# Patient Record
Sex: FEMALE | Race: WHITE | NOT HISPANIC OR LATINO | Employment: FULL TIME | URBAN - METROPOLITAN AREA
[De-identification: names, ages, dates, MRNs, and addresses within clinical notes are randomized per-mention and may not be internally consistent; named-entity substitution may affect disease eponyms.]

---

## 2023-01-07 ENCOUNTER — APPOINTMENT (OUTPATIENT)
Dept: CT IMAGING | Facility: CLINIC | Age: 38
End: 2023-01-07
Attending: EMERGENCY MEDICINE
Payer: COMMERCIAL

## 2023-01-07 ENCOUNTER — HOSPITAL ENCOUNTER (EMERGENCY)
Facility: CLINIC | Age: 38
Discharge: HOME | End: 2023-01-08
Attending: EMERGENCY MEDICINE | Admitting: EMERGENCY MEDICINE
Payer: COMMERCIAL

## 2023-01-07 ENCOUNTER — APPOINTMENT (OUTPATIENT)
Dept: ULTRASOUND IMAGING | Facility: CLINIC | Age: 38
End: 2023-01-07
Attending: EMERGENCY MEDICINE
Payer: COMMERCIAL

## 2023-01-07 VITALS
TEMPERATURE: 97.4 F | WEIGHT: 217 LBS | OXYGEN SATURATION: 99 % | BODY MASS INDEX: 34.06 KG/M2 | HEIGHT: 67 IN | DIASTOLIC BLOOD PRESSURE: 76 MMHG | RESPIRATION RATE: 16 BRPM | SYSTOLIC BLOOD PRESSURE: 116 MMHG | HEART RATE: 78 BPM

## 2023-01-07 DIAGNOSIS — N83.201 CYST OF RIGHT OVARY: ICD-10-CM

## 2023-01-07 DIAGNOSIS — K52.9 INFLAMMATION OF SMALL INTESTINE: ICD-10-CM

## 2023-01-07 LAB
ALBUMIN SERPL-MCNC: 3.9 G/DL (ref 3.5–5)
ALBUMIN UR-MCNC: NEGATIVE MG/DL
ALP SERPL-CCNC: 58 U/L (ref 45–120)
ALT SERPL W P-5'-P-CCNC: 14 U/L (ref 0–45)
ANION GAP SERPL CALCULATED.3IONS-SCNC: 8 MMOL/L (ref 5–18)
APPEARANCE UR: CLEAR
AST SERPL W P-5'-P-CCNC: 11 U/L (ref 0–40)
BACTERIA #/AREA URNS HPF: ABNORMAL /HPF
BASOPHILS # BLD AUTO: 0 10E3/UL (ref 0–0.2)
BASOPHILS NFR BLD AUTO: 1 %
BILIRUB SERPL-MCNC: 0.9 MG/DL (ref 0–1)
BILIRUB UR QL STRIP: NEGATIVE
BUN SERPL-MCNC: 10 MG/DL (ref 8–22)
CALCIUM SERPL-MCNC: 8.9 MG/DL (ref 8.5–10.5)
CHLORIDE BLD-SCNC: 104 MMOL/L (ref 98–107)
CO2 SERPL-SCNC: 26 MMOL/L (ref 22–31)
COLOR UR AUTO: ABNORMAL
CREAT SERPL-MCNC: 0.69 MG/DL (ref 0.6–1.1)
EOSINOPHIL # BLD AUTO: 0.1 10E3/UL (ref 0–0.7)
EOSINOPHIL NFR BLD AUTO: 1 %
ERYTHROCYTE [DISTWIDTH] IN BLOOD BY AUTOMATED COUNT: 12.3 % (ref 10–15)
GFR SERPL CREATININE-BSD FRML MDRD: >90 ML/MIN/1.73M2
GLUCOSE BLD-MCNC: 79 MG/DL (ref 70–125)
GLUCOSE UR STRIP-MCNC: NEGATIVE MG/DL
HCG SERPL QL: NEGATIVE
HCT VFR BLD AUTO: 43.1 % (ref 35–47)
HGB BLD-MCNC: 14.7 G/DL (ref 11.7–15.7)
HGB UR QL STRIP: NEGATIVE
IMM GRANULOCYTES # BLD: 0 10E3/UL
IMM GRANULOCYTES NFR BLD: 0 %
KETONES UR STRIP-MCNC: ABNORMAL MG/DL
LEUKOCYTE ESTERASE UR QL STRIP: ABNORMAL
LIPASE SERPL-CCNC: 21 U/L (ref 0–52)
LYMPHOCYTES # BLD AUTO: 2.2 10E3/UL (ref 0.8–5.3)
LYMPHOCYTES NFR BLD AUTO: 27 %
MCH RBC QN AUTO: 30.1 PG (ref 26.5–33)
MCHC RBC AUTO-ENTMCNC: 34.1 G/DL (ref 31.5–36.5)
MCV RBC AUTO: 88 FL (ref 78–100)
MONOCYTES # BLD AUTO: 0.6 10E3/UL (ref 0–1.3)
MONOCYTES NFR BLD AUTO: 8 %
MUCOUS THREADS #/AREA URNS LPF: PRESENT /LPF
NEUTROPHILS # BLD AUTO: 5.3 10E3/UL (ref 1.6–8.3)
NEUTROPHILS NFR BLD AUTO: 63 %
NITRATE UR QL: NEGATIVE
NRBC # BLD AUTO: 0 10E3/UL
NRBC BLD AUTO-RTO: 0 /100
PH UR STRIP: 5.5 [PH] (ref 5–7)
PLATELET # BLD AUTO: 283 10E3/UL (ref 150–450)
POTASSIUM BLD-SCNC: 4 MMOL/L (ref 3.5–5)
PROT SERPL-MCNC: 7.2 G/DL (ref 6–8)
RBC # BLD AUTO: 4.88 10E6/UL (ref 3.8–5.2)
RBC URINE: 2 /HPF
SODIUM SERPL-SCNC: 138 MMOL/L (ref 136–145)
SP GR UR STRIP: 1.02 (ref 1–1.03)
SQUAMOUS EPITHELIAL: 1 /HPF
UROBILINOGEN UR STRIP-MCNC: <2 MG/DL
WBC # BLD AUTO: 8.4 10E3/UL (ref 4–11)
WBC URINE: 5 /HPF

## 2023-01-07 PROCEDURE — 85025 COMPLETE CBC W/AUTO DIFF WBC: CPT | Performed by: EMERGENCY MEDICINE

## 2023-01-07 PROCEDURE — 83690 ASSAY OF LIPASE: CPT | Performed by: EMERGENCY MEDICINE

## 2023-01-07 PROCEDURE — 76856 US EXAM PELVIC COMPLETE: CPT

## 2023-01-07 PROCEDURE — 87086 URINE CULTURE/COLONY COUNT: CPT | Performed by: EMERGENCY MEDICINE

## 2023-01-07 PROCEDURE — 74177 CT ABD & PELVIS W/CONTRAST: CPT

## 2023-01-07 PROCEDURE — 258N000003 HC RX IP 258 OP 636: Performed by: EMERGENCY MEDICINE

## 2023-01-07 PROCEDURE — 84703 CHORIONIC GONADOTROPIN ASSAY: CPT | Performed by: EMERGENCY MEDICINE

## 2023-01-07 PROCEDURE — 81001 URINALYSIS AUTO W/SCOPE: CPT | Performed by: EMERGENCY MEDICINE

## 2023-01-07 PROCEDURE — 96361 HYDRATE IV INFUSION ADD-ON: CPT

## 2023-01-07 PROCEDURE — 96374 THER/PROPH/DIAG INJ IV PUSH: CPT | Mod: 59

## 2023-01-07 PROCEDURE — 80053 COMPREHEN METABOLIC PANEL: CPT | Performed by: EMERGENCY MEDICINE

## 2023-01-07 PROCEDURE — 250N000011 HC RX IP 250 OP 636: Performed by: EMERGENCY MEDICINE

## 2023-01-07 PROCEDURE — 99285 EMERGENCY DEPT VISIT HI MDM: CPT | Mod: 25

## 2023-01-07 PROCEDURE — 36415 COLL VENOUS BLD VENIPUNCTURE: CPT | Performed by: EMERGENCY MEDICINE

## 2023-01-07 RX ORDER — KETOROLAC TROMETHAMINE 15 MG/ML
15 INJECTION, SOLUTION INTRAMUSCULAR; INTRAVENOUS ONCE
Status: COMPLETED | OUTPATIENT
Start: 2023-01-07 | End: 2023-01-07

## 2023-01-07 RX ORDER — OXYCODONE AND ACETAMINOPHEN 5; 325 MG/1; MG/1
1 TABLET ORAL EVERY 6 HOURS PRN
Qty: 12 TABLET | Refills: 0 | Status: SHIPPED | OUTPATIENT
Start: 2023-01-07 | End: 2023-01-10

## 2023-01-07 RX ORDER — IOPAMIDOL 755 MG/ML
90 INJECTION, SOLUTION INTRAVASCULAR ONCE
Status: COMPLETED | OUTPATIENT
Start: 2023-01-07 | End: 2023-01-07

## 2023-01-07 RX ORDER — SODIUM CHLORIDE 9 MG/ML
INJECTION, SOLUTION INTRAVENOUS CONTINUOUS
Status: DISCONTINUED | OUTPATIENT
Start: 2023-01-07 | End: 2023-01-07

## 2023-01-07 RX ADMIN — KETOROLAC TROMETHAMINE 15 MG: 15 INJECTION, SOLUTION INTRAMUSCULAR; INTRAVENOUS at 19:40

## 2023-01-07 RX ADMIN — SODIUM CHLORIDE 1000 ML: 9 INJECTION, SOLUTION INTRAVENOUS at 19:40

## 2023-01-07 RX ADMIN — IOPAMIDOL 90 ML: 755 INJECTION, SOLUTION INTRAVENOUS at 23:22

## 2023-01-07 ASSESSMENT — ENCOUNTER SYMPTOMS
ABDOMINAL PAIN: 1
DIAPHORESIS: 0
FEVER: 0
BLOOD IN STOOL: 0
BACK PAIN: 0
DIARRHEA: 0
FLANK PAIN: 0
CHILLS: 0

## 2023-01-07 ASSESSMENT — ACTIVITIES OF DAILY LIVING (ADL)
ADLS_ACUITY_SCORE: 33
ADLS_ACUITY_SCORE: 35
ADLS_ACUITY_SCORE: 35

## 2023-01-08 NOTE — ED PROVIDER NOTES
EMERGENCY DEPARTMENT ENCOUNTER      NAME: Lor Davila  AGE: 37 year old female  YOB: 1985  MRN: [id number]  EVALUATION DATE & TIME: 1/7/2023  6:59 PM    PCP: Chel Chanel    ED PROVIDER: JOE Graves    Chief Complaint   Patient presents with     Abdominal Pain     FINAL IMPRESSION:  1. Inflammation of small intestine    2. Cyst of right ovary        ED COURSE & MEDICAL DECISION MAKING:    Pertinent Labs & Imaging studies reviewed. (See chart for details)    37 year old female presents to the Emergency Department for evaluation of abdominal discomfort.  Patient was in her usual state of health when she had sudden onset of lower abdominal pain.  Describes a midline lower abdominal pain from the umbilicus down. At times its intense and seems to wax and wane in intensity.  Patient has never had a pain like this before.  Remote history of appendectomy when she was age 14.  Otherwise healthy.  Usual state of health earlier today with no symptomatology.  Has not urinated since the pain started no history of recurrent UTI.  No vaginal symptoms.  Has an IUD.  No history of ovarian issues.  No history of kidney stones.  On examination patient had left lower quadrant and suprapubic pain to palpation.  No guarding or peritoneal signs.  Vital signs were unremarkable and clinical examination was otherwise unremarkable.  I felt ovarian pathology was initially the most likely diagnosis.  I recommended the patient that we initiate screen laboratory testing urinalysis and a pelvic ultrasound for additional assessment.  If ultrasound is nondiagnostic urinalysis does not demonstrate infection would anticipate need for CT scan imaging.  Will reassess after return of initial testing.    12:07 AM  Ultrasound demonstrated a right-sided hemorrhagic ovarian cyst.  This could be the source of patient's pain although her pain seemed more focused on the left side to a somewhat unusual location I did recommend CT  scan imaging for further assessment.  CT scan imaging demonstrating inflammation of the terminal ileum.  Could be associated with inflammatory bowel disease.  On further discussion with the patient she does have some chronic issues with her stomach and intestines is not relating any diarrhea or bloody stool currently so unclear if this is an incidental finding or could be related to her symptoms I would favor given the sudden onset of her symptoms tonight that is more consistent with a hemorrhagic ovarian cyst.  Ultimately given her clinical improvement and otherwise negative work-up I think discharge home is appropriate.  Her significant other who is here in the emergency department actually suffers from Crohn's disease so he is well knowledgeable on the subject.  Overall plan of care for discharge home on anti-inflammatory medication for management of her symptoms follow-up with [facility] recommended in her primary care doctor.  Serial ultrasounds to ensure resolution of her cyst.  I also did prescribe prescription pain medication for breakthrough pain should her symptoms not be controlled with ibuprofen or Tylenol.  Patient comfortable this plan of care.    7:02 PM  I met the patient and performed my initial interview and exam.     11:55 PM   Rechecked and updated patient. I discussed plans for discharge with the patient, which they were agreeable to. We discussed supportive cares at home and reasons for return to the ER including new or worsening symptoms. All questions and concerns were addressed. Patient to be discharged by RN.       Medical Decision Making    History:    Supplemental history from: Documented in HPI, if applicable    External Record(s) reviewed: Documented in HPI, if applicable.    Work Up:    Chart documentation includes differential considered and any EKGs or imaging independently interpreted by provider.    In additional to work up documented, I considered the following  work up: See chart documentation, if applicable.    External consultation:    Discussion of management with another provider: See chart documentation, if applicable    Complicating factors:    Care impacted by chronic illness: N/A    Care affected by social determinants of health: N/A    Disposition considerations: Discharge. I prescribed additional prescription strength medication(s) as charted. I considered admission, but discharged patient after significant clinical improvement.        MEDICATIONS GIVEN IN THE EMERGENCY:  New Prescriptions    No medications on file       NEW PRESCRIPTIONS STARTED AT TODAY'S ER VISIT  Patient's Medications   New Prescriptions    No medications on file   Previous Medications    FAMOTIDINE (PEPCID) 20 MG TABLET    [FAMOTIDINE (PEPCID) 20 MG TABLET] Take 1 tablet (20 mg total) by mouth 2 (two) times a day.    ONDANSETRON (ZOFRAN ODT) 8 MG DISINTEGRATING TABLET    [ONDANSETRON (ZOFRAN ODT) 8 MG DISINTEGRATING TABLET] Take 1 tablet (8 mg total) by mouth every 6 (six) hours as needed for nausea.    PANTOPRAZOLE (PROTONIX) 40 MG TABLET    [PANTOPRAZOLE (PROTONIX) 40 MG TABLET] Take 1 tablet (40 mg total) by mouth daily.   Modified Medications    No medications on file   Discontinued Medications    No medications on file          =================================================================    HPI    Patient information was obtained from: Patient    Use of Intrepreter: N/A       Lor Davila is a 37 year old female with a pertinent medical history of GERD and thyroid disorder who presents to the ED via walk-in for evaluation of abdominal pain.    Patient reports that approximately 2 hours ago around 5:00 - 5:30 PM she had a sudden onset of lower-middle abdominal pain. She states that the pain is intermittent, but notes that when the pain onsets it is significantly sharp for approximately 5 - 10 minutes. She mentions that this pain is localized. She denies having any of her  "current symptoms in the past. She endorses having a normal bowel movement since onset of the pain, and she denies any recent blood in her stool or diarrhea. She denies urinating since onset of the pain. She denies any pregnancy concerns. She denies any known history of kidney stones or ovarian cysts. She denies any history of urinary issues except for when she was pregnant. She endorses a PMH of an appendectomy in 1999. She states she has an IUD in place and so her menstrual periods are irregular, but she denies any recent vaginal bleeding. She also denies any recent fever, chills, diaphoresis, back pain, flank pain, or any other complications at this time.      REVIEW OF SYSTEMS   Review of Systems   Constitutional: Negative for chills, diaphoresis and fever.   Gastrointestinal: Positive for abdominal pain (lower-middle). Negative for blood in stool and diarrhea.   Genitourinary: Negative for flank pain and vaginal bleeding.   Musculoskeletal: Negative for back pain.   All other systems reviewed and are negative.       PAST MEDICAL HISTORY:  History reviewed. No pertinent past medical history.    PAST SURGICAL HISTORY:  No past surgical history on file.    ALLERGIES:  No Known Allergies    FAMILY HISTORY:  No family history on file.    SOCIAL HISTORY:   Social History     Socioeconomic History     Marital status:    Tobacco Use     Smoking status: Never   Substance and Sexual Activity     Alcohol use: No     Drug use: No       VITALS:  Patient Vitals for the past 24 hrs:   BP Temp Temp src Pulse Resp SpO2 Height Weight   01/07/23 1804 116/76 97.4  F (36.3  C) Temporal 78 16 99 % 1.702 m (5' 7\") 98.4 kg (217 lb)       PHYSICAL EXAM    Constitutional: Well developed, Well nourished, NAD  HENT: Normocephalic, Atraumatic, Bilateral external ears normal, Oropharynx normal, mucous membranes moist, Nose normal. Neck-  Normal range of motion, No tenderness, Supple, No stridor.   Eyes: PERRL, EOMI, Conjunctiva " normal, No discharge.   Respiratory: Normal breath sounds, No respiratory distress, No wheezing, Speaks full sentences easily. No cough.   Cardiovascular: Normal heart rate, Regular rhythm, No murmurs Chest wall nontender.    GI: Lower abdominal pain, suprapubic and left lower quadrant no guarding or peritoneal signs  : No cva tenderness    Musculoskeletal: 2+ DP pulses. No edema. No cyanosis. Good range of motion in all major joints. No tenderness to palpation. No tenderness of the CTLS spine.   Integument: Warm, Dry, No erythema, No rash. No petechiae.   Neurologic: Alert & oriented x 3, Normal motor function, Normal sensory function, No focal deficits noted.   Psychiatric: Affect normal, Judgment normal, Mood normal. Cooperative.       LAB:  All pertinent labs reviewed and interpreted.  Results for orders placed or performed during the hospital encounter of 01/07/23   US Pelvis Cmplt w Transvag & Doppler LmtPel Duplex Limited    Impression    IMPRESSION:    1.  Normal Doppler flow demonstrated to both ovaries.    2.  At the right adnexa there is a 2.5 cm presumed hemorrhagic cyst or follicle. Abutting this is a 2.4 cm echogenic focus which appears avascular on Doppler interrogation. This is within a moderate amount of complex fluid in the right adnexal region, of   similar echogenicity to the fluid in the posterior cul-de-sac. It is possible that this represents the sequela of a recent follicle rupture/hemorrhage. If there is concern for other etiologies such as infection, a contrast CT may be of benefit.         CT Abdomen Pelvis w Contrast    Impression    IMPRESSION:   1.  Mildly inflamed terminal ileum suggesting inflammatory bowel disease (Crohn's disease). Follow-up GI consultation recommended.   Comprehensive metabolic panel   Result Value Ref Range    Sodium 138 136 - 145 mmol/L    Potassium 4.0 3.5 - 5.0 mmol/L    Chloride 104 98 - 107 mmol/L    Carbon Dioxide (CO2) 26 22 - 31 mmol/L    Anion Gap 8 5 -  18 mmol/L    Urea Nitrogen 10 8 - 22 mg/dL    Creatinine 0.69 0.60 - 1.10 mg/dL    Calcium 8.9 8.5 - 10.5 mg/dL    Glucose 79 70 - 125 mg/dL    Alkaline Phosphatase 58 45 - 120 U/L    AST 11 0 - 40 U/L    ALT 14 0 - 45 U/L    Protein Total 7.2 6.0 - 8.0 g/dL    Albumin 3.9 3.5 - 5.0 g/dL    Bilirubin Total 0.9 0.0 - 1.0 mg/dL    GFR Estimate >90 >60 mL/min/1.73m2   Result Value Ref Range    Lipase 21 0 - 52 U/L   UA with Microscopic reflex to Culture    Specimen: Urine, Clean Catch   Result Value Ref Range    Color Urine Light Yellow Colorless, Straw, Light Yellow, Yellow    Appearance Urine Clear Clear    Glucose Urine Negative Negative mg/dL    Bilirubin Urine Negative Negative    Ketones Urine Trace (A) Negative mg/dL    Specific Gravity Urine 1.021 1.001 - 1.030    Blood Urine Negative Negative    pH Urine 5.5 5.0 - 7.0    Protein Albumin Urine Negative Negative mg/dL    Urobilinogen Urine <2.0 <2.0 mg/dL    Nitrite Urine Negative Negative    Leukocyte Esterase Urine 250 Zeb/uL (A) Negative    Bacteria Urine Few (A) None Seen /HPF    Mucus Urine Present (A) None Seen /LPF    RBC Urine 2 <=2 /HPF    WBC Urine 5 <=5 /HPF    Squamous Epithelials Urine 1 <=1 /HPF   HCG QUALitative pregnancy (blood)   Result Value Ref Range    hCG Serum Qualitative Negative Negative   CBC with platelets and differential   Result Value Ref Range    WBC Count 8.4 4.0 - 11.0 10e3/uL    RBC Count 4.88 3.80 - 5.20 10e6/uL    Hemoglobin 14.7 11.7 - 15.7 g/dL    Hematocrit 43.1 35.0 - 47.0 %    MCV 88 78 - 100 fL    MCH 30.1 26.5 - 33.0 pg    MCHC 34.1 31.5 - 36.5 g/dL    RDW 12.3 10.0 - 15.0 %    Platelet Count 283 150 - 450 10e3/uL    % Neutrophils 63 %    % Lymphocytes 27 %    % Monocytes 8 %    % Eosinophils 1 %    % Basophils 1 %    % Immature Granulocytes 0 %    NRBCs per 100 WBC 0 <1 /100    Absolute Neutrophils 5.3 1.6 - 8.3 10e3/uL    Absolute Lymphocytes 2.2 0.8 - 5.3 10e3/uL    Absolute Monocytes 0.6 0.0 - 1.3 10e3/uL    Absolute  Eosinophils 0.1 0.0 - 0.7 10e3/uL    Absolute Basophils 0.0 0.0 - 0.2 10e3/uL    Absolute Immature Granulocytes 0.0 <=0.4 10e3/uL    Absolute NRBCs 0.0 10e3/uL       RADIOLOGY:  Reviewed all pertinent imaging. Please see official radiology report.  CT Abdomen Pelvis w Contrast   Final Result   IMPRESSION:    1.  Mildly inflamed terminal ileum suggesting inflammatory bowel disease (Crohn's disease). Follow-up GI consultation recommended.      US Pelvis Cmplt w Transvag & Doppler LmtPel Duplex Limited   Final Result   IMPRESSION:     1.  Normal Doppler flow demonstrated to both ovaries.      2.  At the right adnexa there is a 2.5 cm presumed hemorrhagic cyst or follicle. Abutting this is a 2.4 cm echogenic focus which appears avascular on Doppler interrogation. This is within a moderate amount of complex fluid in the right adnexal region, of    similar echogenicity to the fluid in the posterior cul-de-sac. It is possible that this represents the sequela of a recent follicle rupture/hemorrhage. If there is concern for other etiologies such as infection, a contrast CT may be of benefit.                   EKG:    None.    PROCEDURES:   None.       I, Bentley Meza, am serving as a scribe to document services personally performed by Dr. Graves based on my observation and the provider's statements to me. I, Tanner Graves MD attest that Bentley Meza is acting in a scribe capacity, has observed my performance of the services and has documented them in accordance with my direction.    Tanner Graves M.D.  Emergency Medicine  [facility] EMERGENCY ROOM  [address] [place] [id number] [phone]  Dept: [phone]         Tanner Graves MD  01/08/23 0009

## 2023-01-08 NOTE — DISCHARGE INSTRUCTIONS
Your laboratory testing overall was reassuring.  The ultrasound demonstrated a hemorrhagic ovarian cyst on the right side that was small this should be self resolving.  Recommend follow-up on outpatient basis and the next 2 to 4 weeks for repeat ultrasound to ensure resolution.  This could be the acute source for your pain.  We also perform CT scan imaging of your abdomen and pelvis which demonstrated inflammation of the very end portion of your small intestine.  Inflammation in this area can sometimes be associated with inflammatory bowel disease such as Crohn's.  This will need further work-up on an outpatient basis to further characterize and identify the causative factor.  Recommend taking anti-inflammatory drugs such as ibuprofen for management of your symptoms.  If you have escalation your symptoms or develop additional concern return to the emergency department for repeat assessment.  Please follow-up with [facility] on outpatient basis call to schedule appointment.  In addition please follow-up with your primary care doctor next week.    Pain medication as prescribed for management of breakthrough pain.  Recommend taking Tylenol and or ibuprofen for your symptoms first and utilize the prescription pain medication if you have breakthrough pain.

## 2023-01-08 NOTE — ED TRIAGE NOTES
The patient presents to the ED with lower abdominal pain that began suddenly about 1 hour ago. The patient denies nausea, vomiting or fever. The patient denies any pertinent medical history. The patient reports the pain waxes and wanes in severity. The patient had her appendix removed when she was 14.

## 2023-01-09 LAB — BACTERIA UR CULT: NO GROWTH

## 2023-10-14 ENCOUNTER — HEALTH MAINTENANCE LETTER (OUTPATIENT)
Age: 38
End: 2023-10-14

## 2024-12-07 ENCOUNTER — HEALTH MAINTENANCE LETTER (OUTPATIENT)
Age: 39
End: 2024-12-07